# Patient Record
Sex: MALE | Race: WHITE | Employment: OTHER | URBAN - METROPOLITAN AREA
[De-identification: names, ages, dates, MRNs, and addresses within clinical notes are randomized per-mention and may not be internally consistent; named-entity substitution may affect disease eponyms.]

---

## 2020-01-21 ENCOUNTER — APPOINTMENT (OUTPATIENT)
Dept: ULTRASOUND IMAGING | Facility: CLINIC | Age: 50
End: 2020-01-21
Attending: EMERGENCY MEDICINE
Payer: COMMERCIAL

## 2020-01-21 ENCOUNTER — HOSPITAL ENCOUNTER (INPATIENT)
Facility: CLINIC | Age: 50
LOS: 1 days | Discharge: HOME | End: 2020-01-22
Attending: EMERGENCY MEDICINE | Admitting: INTERNAL MEDICINE
Payer: COMMERCIAL

## 2020-01-21 DIAGNOSIS — E87.6 HYPOKALEMIA: ICD-10-CM

## 2020-01-21 DIAGNOSIS — K92.0 COFFEE GROUND EMESIS: ICD-10-CM

## 2020-01-21 DIAGNOSIS — K92.2 UPPER GI BLEED: Primary | ICD-10-CM

## 2020-01-21 DIAGNOSIS — F10.930 ALCOHOL WITHDRAWAL, UNCOMPLICATED (H): ICD-10-CM

## 2020-01-21 DIAGNOSIS — E83.42 HYPOMAGNESEMIA: ICD-10-CM

## 2020-01-21 DIAGNOSIS — K85.20 ALCOHOL-INDUCED ACUTE PANCREATITIS WITHOUT INFECTION OR NECROSIS: ICD-10-CM

## 2020-01-21 LAB
ABO + RH BLD: NORMAL
ABO + RH BLD: NORMAL
ALBUMIN SERPL-MCNC: 4.3 G/DL (ref 3.4–5)
ALP SERPL-CCNC: 124 U/L (ref 40–150)
ALT SERPL W P-5'-P-CCNC: 45 U/L (ref 0–70)
ANION GAP SERPL CALCULATED.3IONS-SCNC: 16 MMOL/L (ref 3–14)
AST SERPL W P-5'-P-CCNC: 63 U/L (ref 0–45)
BASOPHILS # BLD AUTO: 0.1 10E9/L (ref 0–0.2)
BASOPHILS NFR BLD AUTO: 0.6 %
BILIRUB SERPL-MCNC: 2.6 MG/DL (ref 0.2–1.3)
BLD GP AB SCN SERPL QL: NORMAL
BLOOD BANK CMNT PATIENT-IMP: NORMAL
BUN SERPL-MCNC: 18 MG/DL (ref 7–30)
CALCIUM SERPL-MCNC: 9.9 MG/DL (ref 8.5–10.1)
CHLORIDE SERPL-SCNC: 90 MMOL/L (ref 94–109)
CO2 SERPL-SCNC: 21 MMOL/L (ref 20–32)
CREAT SERPL-MCNC: 0.6 MG/DL (ref 0.66–1.25)
DIFFERENTIAL METHOD BLD: ABNORMAL
EOSINOPHIL # BLD AUTO: 0.3 10E9/L (ref 0–0.7)
EOSINOPHIL NFR BLD AUTO: 2.3 %
ERYTHROCYTE [DISTWIDTH] IN BLOOD BY AUTOMATED COUNT: 12.8 % (ref 10–15)
ETHANOL SERPL-MCNC: <0.01 G/DL
GFR SERPL CREATININE-BSD FRML MDRD: >90 ML/MIN/{1.73_M2}
GLUCOSE SERPL-MCNC: 153 MG/DL (ref 70–99)
HCT VFR BLD AUTO: 38.5 % (ref 40–53)
HEMOCCULT STL QL: POSITIVE
HGB BLD-MCNC: 10.6 G/DL (ref 13.3–17.7)
HGB BLD-MCNC: 11.7 G/DL (ref 13.3–17.7)
HGB BLD-MCNC: 13.1 G/DL (ref 13.3–17.7)
IMM GRANULOCYTES # BLD: 0.1 10E9/L (ref 0–0.4)
IMM GRANULOCYTES NFR BLD: 0.4 %
INTERPRETATION ECG - MUSE: NORMAL
LACTATE BLD-SCNC: 1.1 MMOL/L (ref 0.7–2)
LACTATE BLD-SCNC: 2.8 MMOL/L (ref 0.7–2)
LIPASE SERPL-CCNC: 600 U/L (ref 73–393)
LYMPHOCYTES # BLD AUTO: 0.8 10E9/L (ref 0.8–5.3)
LYMPHOCYTES NFR BLD AUTO: 7.1 %
MAGNESIUM SERPL-MCNC: 1.4 MG/DL (ref 1.6–2.3)
MAGNESIUM SERPL-MCNC: 2.2 MG/DL (ref 1.6–2.3)
MCH RBC QN AUTO: 33.6 PG (ref 26.5–33)
MCHC RBC AUTO-ENTMCNC: 34 G/DL (ref 31.5–36.5)
MCV RBC AUTO: 99 FL (ref 78–100)
MONOCYTES # BLD AUTO: 1.7 10E9/L (ref 0–1.3)
MONOCYTES NFR BLD AUTO: 14.2 %
NEUTROPHILS # BLD AUTO: 8.8 10E9/L (ref 1.6–8.3)
NEUTROPHILS NFR BLD AUTO: 75.4 %
NRBC # BLD AUTO: 0 10*3/UL
NRBC BLD AUTO-RTO: 0 /100
PHOSPHATE SERPL-MCNC: 1.9 MG/DL (ref 2.5–4.5)
PHOSPHATE SERPL-MCNC: 2.2 MG/DL (ref 2.5–4.5)
PLATELET # BLD AUTO: 183 10E9/L (ref 150–450)
POTASSIUM SERPL-SCNC: 2.7 MMOL/L (ref 3.4–5.3)
POTASSIUM SERPL-SCNC: 3.2 MMOL/L (ref 3.4–5.3)
POTASSIUM SERPL-SCNC: 3.2 MMOL/L (ref 3.4–5.3)
PROT SERPL-MCNC: 8 G/DL (ref 6.8–8.8)
RBC # BLD AUTO: 3.9 10E12/L (ref 4.4–5.9)
SODIUM SERPL-SCNC: 127 MMOL/L (ref 133–144)
SODIUM SERPL-SCNC: 132 MMOL/L (ref 133–144)
SPECIMEN EXP DATE BLD: NORMAL
WBC # BLD AUTO: 11.7 10E9/L (ref 4–11)

## 2020-01-21 PROCEDURE — 25000128 H RX IP 250 OP 636: Performed by: PHYSICIAN ASSISTANT

## 2020-01-21 PROCEDURE — 86901 BLOOD TYPING SEROLOGIC RH(D): CPT | Performed by: PHYSICIAN ASSISTANT

## 2020-01-21 PROCEDURE — 76705 ECHO EXAM OF ABDOMEN: CPT

## 2020-01-21 PROCEDURE — 84100 ASSAY OF PHOSPHORUS: CPT | Performed by: INTERNAL MEDICINE

## 2020-01-21 PROCEDURE — 25000132 ZZH RX MED GY IP 250 OP 250 PS 637: Performed by: INTERNAL MEDICINE

## 2020-01-21 PROCEDURE — 25800030 ZZH RX IP 258 OP 636: Performed by: EMERGENCY MEDICINE

## 2020-01-21 PROCEDURE — 86900 BLOOD TYPING SEROLOGIC ABO: CPT | Performed by: PHYSICIAN ASSISTANT

## 2020-01-21 PROCEDURE — 36415 COLL VENOUS BLD VENIPUNCTURE: CPT | Performed by: PHYSICIAN ASSISTANT

## 2020-01-21 PROCEDURE — 85018 HEMOGLOBIN: CPT | Performed by: PHYSICIAN ASSISTANT

## 2020-01-21 PROCEDURE — 84295 ASSAY OF SERUM SODIUM: CPT | Performed by: PHYSICIAN ASSISTANT

## 2020-01-21 PROCEDURE — 96366 THER/PROPH/DIAG IV INF ADDON: CPT

## 2020-01-21 PROCEDURE — C9113 INJ PANTOPRAZOLE SODIUM, VIA: HCPCS | Performed by: PHYSICIAN ASSISTANT

## 2020-01-21 PROCEDURE — 96361 HYDRATE IV INFUSION ADD-ON: CPT

## 2020-01-21 PROCEDURE — 85025 COMPLETE CBC W/AUTO DIFF WBC: CPT | Performed by: EMERGENCY MEDICINE

## 2020-01-21 PROCEDURE — 83735 ASSAY OF MAGNESIUM: CPT | Performed by: EMERGENCY MEDICINE

## 2020-01-21 PROCEDURE — 25000128 H RX IP 250 OP 636: Performed by: EMERGENCY MEDICINE

## 2020-01-21 PROCEDURE — 96365 THER/PROPH/DIAG IV INF INIT: CPT

## 2020-01-21 PROCEDURE — C9113 INJ PANTOPRAZOLE SODIUM, VIA: HCPCS | Performed by: EMERGENCY MEDICINE

## 2020-01-21 PROCEDURE — 82272 OCCULT BLD FECES 1-3 TESTS: CPT | Performed by: EMERGENCY MEDICINE

## 2020-01-21 PROCEDURE — HZ2ZZZZ DETOXIFICATION SERVICES FOR SUBSTANCE ABUSE TREATMENT: ICD-10-PCS | Performed by: PHYSICIAN ASSISTANT

## 2020-01-21 PROCEDURE — 96368 THER/DIAG CONCURRENT INF: CPT

## 2020-01-21 PROCEDURE — 84132 ASSAY OF SERUM POTASSIUM: CPT | Performed by: PHYSICIAN ASSISTANT

## 2020-01-21 PROCEDURE — 80053 COMPREHEN METABOLIC PANEL: CPT | Performed by: EMERGENCY MEDICINE

## 2020-01-21 PROCEDURE — 96375 TX/PRO/DX INJ NEW DRUG ADDON: CPT

## 2020-01-21 PROCEDURE — 25000132 ZZH RX MED GY IP 250 OP 250 PS 637: Performed by: PHYSICIAN ASSISTANT

## 2020-01-21 PROCEDURE — 25000125 ZZHC RX 250: Performed by: PHYSICIAN ASSISTANT

## 2020-01-21 PROCEDURE — 12000000 ZZH R&B MED SURG/OB

## 2020-01-21 PROCEDURE — 36415 COLL VENOUS BLD VENIPUNCTURE: CPT | Performed by: INTERNAL MEDICINE

## 2020-01-21 PROCEDURE — 83690 ASSAY OF LIPASE: CPT | Performed by: EMERGENCY MEDICINE

## 2020-01-21 PROCEDURE — 99285 EMERGENCY DEPT VISIT HI MDM: CPT | Mod: 25

## 2020-01-21 PROCEDURE — 84132 ASSAY OF SERUM POTASSIUM: CPT | Performed by: INTERNAL MEDICINE

## 2020-01-21 PROCEDURE — 84100 ASSAY OF PHOSPHORUS: CPT | Performed by: PHYSICIAN ASSISTANT

## 2020-01-21 PROCEDURE — 83605 ASSAY OF LACTIC ACID: CPT | Performed by: PHYSICIAN ASSISTANT

## 2020-01-21 PROCEDURE — 80320 DRUG SCREEN QUANTALCOHOLS: CPT | Performed by: EMERGENCY MEDICINE

## 2020-01-21 PROCEDURE — 83735 ASSAY OF MAGNESIUM: CPT | Performed by: PHYSICIAN ASSISTANT

## 2020-01-21 PROCEDURE — 93005 ELECTROCARDIOGRAM TRACING: CPT

## 2020-01-21 PROCEDURE — 86850 RBC ANTIBODY SCREEN: CPT | Performed by: PHYSICIAN ASSISTANT

## 2020-01-21 PROCEDURE — 99223 1ST HOSP IP/OBS HIGH 75: CPT | Mod: AI | Performed by: INTERNAL MEDICINE

## 2020-01-21 PROCEDURE — 83605 ASSAY OF LACTIC ACID: CPT | Performed by: EMERGENCY MEDICINE

## 2020-01-21 PROCEDURE — 25800030 ZZH RX IP 258 OP 636: Performed by: PHYSICIAN ASSISTANT

## 2020-01-21 RX ORDER — MAGNESIUM SULFATE HEPTAHYDRATE 40 MG/ML
4 INJECTION, SOLUTION INTRAVENOUS EVERY 4 HOURS PRN
Status: DISCONTINUED | OUTPATIENT
Start: 2020-01-21 | End: 2020-01-22 | Stop reason: HOSPADM

## 2020-01-21 RX ORDER — MAGNESIUM SULFATE HEPTAHYDRATE 40 MG/ML
4 INJECTION, SOLUTION INTRAVENOUS ONCE
Status: COMPLETED | OUTPATIENT
Start: 2020-01-21 | End: 2020-01-21

## 2020-01-21 RX ORDER — MULTIPLE VITAMINS W/ MINERALS TAB 9MG-400MCG
1 TAB ORAL DAILY
Status: DISCONTINUED | OUTPATIENT
Start: 2020-01-22 | End: 2020-01-22 | Stop reason: HOSPADM

## 2020-01-21 RX ORDER — POTASSIUM CHLORIDE 1500 MG/1
20-40 TABLET, EXTENDED RELEASE ORAL
Status: DISCONTINUED | OUTPATIENT
Start: 2020-01-21 | End: 2020-01-22 | Stop reason: HOSPADM

## 2020-01-21 RX ORDER — SODIUM CHLORIDE, SODIUM LACTATE, POTASSIUM CHLORIDE, CALCIUM CHLORIDE 600; 310; 30; 20 MG/100ML; MG/100ML; MG/100ML; MG/100ML
INJECTION, SOLUTION INTRAVENOUS CONTINUOUS
Status: DISCONTINUED | OUTPATIENT
Start: 2020-01-21 | End: 2020-01-22 | Stop reason: HOSPADM

## 2020-01-21 RX ORDER — LORAZEPAM 1 MG/1
1-2 TABLET ORAL EVERY 30 MIN PRN
Status: DISCONTINUED | OUTPATIENT
Start: 2020-01-21 | End: 2020-01-22 | Stop reason: HOSPADM

## 2020-01-21 RX ORDER — NICOTINE 21 MG/24HR
1 PATCH, TRANSDERMAL 24 HOURS TRANSDERMAL DAILY
Status: DISCONTINUED | OUTPATIENT
Start: 2020-01-21 | End: 2020-01-22 | Stop reason: HOSPADM

## 2020-01-21 RX ORDER — NALOXONE HYDROCHLORIDE 0.4 MG/ML
.1-.4 INJECTION, SOLUTION INTRAMUSCULAR; INTRAVENOUS; SUBCUTANEOUS
Status: DISCONTINUED | OUTPATIENT
Start: 2020-01-21 | End: 2020-01-22 | Stop reason: HOSPADM

## 2020-01-21 RX ORDER — ONDANSETRON 4 MG/1
4 TABLET, ORALLY DISINTEGRATING ORAL EVERY 6 HOURS PRN
Status: DISCONTINUED | OUTPATIENT
Start: 2020-01-21 | End: 2020-01-22 | Stop reason: HOSPADM

## 2020-01-21 RX ORDER — POTASSIUM CL/LIDO/0.9 % NACL 10MEQ/0.1L
10 INTRAVENOUS SOLUTION, PIGGYBACK (ML) INTRAVENOUS
Status: DISCONTINUED | OUTPATIENT
Start: 2020-01-21 | End: 2020-01-22 | Stop reason: HOSPADM

## 2020-01-21 RX ORDER — LANOLIN ALCOHOL/MO/W.PET/CERES
100 CREAM (GRAM) TOPICAL DAILY
Status: DISCONTINUED | OUTPATIENT
Start: 2020-01-22 | End: 2020-01-22 | Stop reason: HOSPADM

## 2020-01-21 RX ORDER — LIDOCAINE 40 MG/G
CREAM TOPICAL
Status: DISCONTINUED | OUTPATIENT
Start: 2020-01-21 | End: 2020-01-22 | Stop reason: HOSPADM

## 2020-01-21 RX ORDER — LORAZEPAM 2 MG/ML
1-2 INJECTION INTRAMUSCULAR EVERY 30 MIN PRN
Status: DISCONTINUED | OUTPATIENT
Start: 2020-01-21 | End: 2020-01-22 | Stop reason: HOSPADM

## 2020-01-21 RX ORDER — ONDANSETRON 2 MG/ML
4 INJECTION INTRAMUSCULAR; INTRAVENOUS EVERY 6 HOURS PRN
Status: DISCONTINUED | OUTPATIENT
Start: 2020-01-21 | End: 2020-01-22 | Stop reason: HOSPADM

## 2020-01-21 RX ORDER — POTASSIUM CL/LIDO/0.9 % NACL 10MEQ/0.1L
10 INTRAVENOUS SOLUTION, PIGGYBACK (ML) INTRAVENOUS
Status: DISCONTINUED | OUTPATIENT
Start: 2020-01-21 | End: 2020-01-21

## 2020-01-21 RX ORDER — OXYCODONE HYDROCHLORIDE 5 MG/1
5-10 TABLET ORAL
Status: DISCONTINUED | OUTPATIENT
Start: 2020-01-21 | End: 2020-01-22 | Stop reason: HOSPADM

## 2020-01-21 RX ORDER — POTASSIUM CHLORIDE 29.8 MG/ML
20 INJECTION INTRAVENOUS
Status: DISCONTINUED | OUTPATIENT
Start: 2020-01-21 | End: 2020-01-22 | Stop reason: HOSPADM

## 2020-01-21 RX ORDER — ONDANSETRON 2 MG/ML
4 INJECTION INTRAMUSCULAR; INTRAVENOUS ONCE
Status: COMPLETED | OUTPATIENT
Start: 2020-01-21 | End: 2020-01-21

## 2020-01-21 RX ORDER — POTASSIUM CHLORIDE 7.45 MG/ML
10 INJECTION INTRAVENOUS
Status: DISCONTINUED | OUTPATIENT
Start: 2020-01-21 | End: 2020-01-22 | Stop reason: HOSPADM

## 2020-01-21 RX ORDER — POTASSIUM CHLORIDE 1.5 G/1.58G
20-40 POWDER, FOR SOLUTION ORAL
Status: DISCONTINUED | OUTPATIENT
Start: 2020-01-21 | End: 2020-01-22 | Stop reason: HOSPADM

## 2020-01-21 RX ORDER — FOLIC ACID 1 MG/1
1 TABLET ORAL DAILY
Status: DISCONTINUED | OUTPATIENT
Start: 2020-01-22 | End: 2020-01-22 | Stop reason: HOSPADM

## 2020-01-21 RX ORDER — LORAZEPAM 2 MG/ML
0.5 INJECTION INTRAMUSCULAR ONCE
Status: COMPLETED | OUTPATIENT
Start: 2020-01-21 | End: 2020-01-21

## 2020-01-21 RX ORDER — HYDROMORPHONE HYDROCHLORIDE 1 MG/ML
.3-.5 INJECTION, SOLUTION INTRAMUSCULAR; INTRAVENOUS; SUBCUTANEOUS
Status: DISCONTINUED | OUTPATIENT
Start: 2020-01-21 | End: 2020-01-22 | Stop reason: HOSPADM

## 2020-01-21 RX ADMIN — POTASSIUM CHLORIDE 20 MEQ: 1.5 POWDER, FOR SOLUTION ORAL at 20:14

## 2020-01-21 RX ADMIN — LORAZEPAM 0.5 MG: 2 INJECTION INTRAMUSCULAR; INTRAVENOUS at 09:53

## 2020-01-21 RX ADMIN — FOLIC ACID: 5 INJECTION, SOLUTION INTRAMUSCULAR; INTRAVENOUS; SUBCUTANEOUS at 14:23

## 2020-01-21 RX ADMIN — PANTOPRAZOLE SODIUM 40 MG: 40 INJECTION, POWDER, FOR SOLUTION INTRAVENOUS at 20:15

## 2020-01-21 RX ADMIN — ONDANSETRON HYDROCHLORIDE 4 MG: 2 INJECTION, SOLUTION INTRAMUSCULAR; INTRAVENOUS at 09:25

## 2020-01-21 RX ADMIN — POTASSIUM CHLORIDE 40 MEQ: 1.5 POWDER, FOR SOLUTION ORAL at 17:49

## 2020-01-21 RX ADMIN — PANTOPRAZOLE SODIUM 40 MG: 40 INJECTION, POWDER, FOR SOLUTION INTRAVENOUS at 09:25

## 2020-01-21 RX ADMIN — Medication 10 MEQ: at 11:01

## 2020-01-21 RX ADMIN — MAGNESIUM SULFATE IN WATER 4 G: 40 INJECTION, SOLUTION INTRAVENOUS at 11:01

## 2020-01-21 RX ADMIN — NICOTINE 1 PATCH: 14 PATCH, EXTENDED RELEASE TRANSDERMAL at 15:47

## 2020-01-21 RX ADMIN — SODIUM CHLORIDE 1000 ML: 9 INJECTION, SOLUTION INTRAVENOUS at 09:24

## 2020-01-21 ASSESSMENT — ACTIVITIES OF DAILY LIVING (ADL)
ADLS_ACUITY_SCORE: 15
ADLS_ACUITY_SCORE: 13

## 2020-01-21 ASSESSMENT — ENCOUNTER SYMPTOMS
VOMITING: 1
ABDOMINAL PAIN: 1
NAUSEA: 1
DIARRHEA: 0

## 2020-01-21 ASSESSMENT — MIFFLIN-ST. JEOR: SCORE: 1633.74

## 2020-01-21 NOTE — ED NOTES
Patient presents via EMS from [facility] in [place]. Patient has had 6 days of nausea, vomiting black, coffee ground-like emesis. Diarrhea started Sunday. Patient denies abdominal pain until he vomits. ABCDs intact, alert and oriented x 4.

## 2020-01-21 NOTE — ED NOTES
Bed: HW01  Expected date: 1/21/20  Expected time: 8:54 AM  Means of arrival: Ambulance  Comments:  A593  GI bleed

## 2020-01-21 NOTE — H&P
History and Physical     Dahiana Rivera MRN# [id number]   YOB: 1970 Age: 49 year old      Date of Admission:  1/21/2020    Primary care provider: No Ref-Primary, Physician          Assessment and Plan:   Dahiana Rivera is a 49 year old male with no dx PMH, who presents with 6 days of coffee ground emesis.     1. N/V, Possible upper GI bleed - pt notes 6 days of coffee ground emesis, none noted here. Stool is positive for occult blood, pt denies melena. Hgb is stable at 13.1. Pt endorses alcohol uses (per below), denies NSAID use. RUQ US shows fatty liver, otherwise unremarkable. Trend hgb, IV Protonix BID, NPO (ok for sips of water) and GI consult.   2. Alcohol abuse, possible withdrawal - pt reports drinking 2-3 drinks 5 days a week, denies hx of alcohol withdrawal. Labs indicate alcohol abuse with low sodium, potassium, and elevated AST. He's tachycardic on arrival and appears tremulous, he endorses this to nerves about being in the hospital. Pt states he has not drank for past days. If hx is accurate, he should not withdraw but will have CIWA protocol available, give IV then oral vitamins and IVFs. Given fatty liver on US, elevated bilirubin of 2.6, consider further outpatient follow up/work up.   3. Hyponatremia - Na 127, possibly due to vomiting and/or chronic alcohol use. IVFs and recheck this afternoon to ensure trending in the right direction.   4. Hypokalemia - K 2.7, possibly due to vomiting and/or chronic alcohol use. Replace per protocol.     Prophylaxis - mechanical  Code status - Full Code  Dispo - admit to inpatient, anticipate at least 2 nights                Chief Complaint:   Vomiting          History of Present Illness:   Dahiana Rivera is a 49 year old male with no dx PMH, who presents with coffee ground emesis. Pt reports onset of vomiting 6 days ago that he describes as black and looks like coffee grounds. He estimates 15 episodes in total. He states he has not eaten solids in 6  days. He notes his stools haven't been normal but denies diarrhea and black tarry stools. He denies fever, chills, SOB, abdominal pain, dysuria, hematuria or bright red blood in stool or emesis. He does note chest pain while vomiting. He denies NSAID use, endorses drinking 2-3 drinks per day 5 days a week. He smokes 1 pack of cigarettes per day. He denies hx of upper GI bleed or hx of alcohol withdrawal. He does state he's been a heavier drinker in the past. He denies any new medications or diet.    In the ED, mildly tachycardic, HRs 110s, T 99.4, VS otherwise stable. CMP is significant for low Na at 127, low K at 2.7, anion gap 16, Mag low at 1.4, total bilirubin is elevated at 2.6 and AST is minimally elevated at 63. Lactic acid is elevated at 2.8 and lipase is elevated at 600. CBC shows elevated WBC at 11.7 and Hgb is stable at 13.1. Stool is positive for occult blood. EtOH is <0.01. RUQ US shows fatty liver otherwise unremarkable. EKG shows sinus tachycardia and non specific ST abnormality.    Hx obtained by speaking with ED physician, chart review and pt interview.               Past Medical History:   History reviewed. No pertinent past medical history.            Past Surgical History:     Past Surgical History:   Procedure Laterality Date     NO HISTORY OF SURGERY                 Social History:     Social History     Socioeconomic History     Marital status:      Spouse name: Not on file     Number of children: Not on file     Years of education: Not on file     Highest education level: Not on file   Occupational History     Not on file   Social Needs     Financial resource strain: Not on file     Food insecurity:     Worry: Not on file     Inability: Not on file     Transportation needs:     Medical: Not on file     Non-medical: Not on file   Tobacco Use     Smoking status: Current Every Day Smoker     Smokeless tobacco: Current User     Tobacco comment: states 1 pack per week   Substance and Sexual  Activity     Alcohol use: Yes     Alcohol/week: 10.0 standard drinks     Types: 10 Shots of liquor per week     Comment: States 10 vodka drinks per week; Last drink last Wednesday, 6 days ago     Drug use: Not Currently     Sexual activity: Not on file   Lifestyle     Physical activity:     Days per week: Not on file     Minutes per session: Not on file     Stress: Not on file   Relationships     Social connections:     Talks on phone: Not on file     Gets together: Not on file     Attends Congregation service: Not on file     Active member of club or organization: Not on file     Attends meetings of clubs or organizations: Not on file     Relationship status: Not on file     Intimate partner violence:     Fear of current or ex partner: Not on file     Emotionally abused: Not on file     Physically abused: Not on file     Forced sexual activity: Not on file   Other Topics Concern     Not on file   Social History Narrative     Not on file               Family History:   Siblings with DM         Allergies:    No Known Allergies            Medications:     Prior to Admission medications    Medication Sig Last Dose Taking? Auth Provider   NO ACTIVE MEDICATIONS .   Reported, Patient   None           Review of Systems:   A Comprehensive greater than 10 system review of systems was carried out.  Pertinent positives and negatives are noted above.  Otherwise negative for contributory information.     Review Of Systems  Skin: negative  Eyes: negative  Ears/Nose/Throat: negative  Respiratory: No shortness of breath, dyspnea on exertion, cough, or hemoptysis  Cardiovascular: negative  Gastrointestinal: negative  Genitourinary: negative  Musculoskeletal: negative  Neurologic: negative  Psychiatric: negative  Hematologic/Lymphatic/Immunologic: negative  Endocrine: negative             Physical Exam:   Blood pressure 111/79, pulse 104, temperature 99.4  F (37.4  C), temperature source Oral, resp. rate 18, weight 83.9 kg (185 lb),  SpO2 98 %.  Exam:  GENERAL:  Comfortable, tremulous.  PSYCH: pleasant, oriented, No acute distress.  HEENT:  Atraumatic, normocephalic. Normal conjunctiva, normal hearing, and oropharynx is normal.  NECK:  Supple, no neck vein distention  HEART:  Normal S1, S2 with no murmur, no pericardial rub, gallops or S3 or S4.  LUNGS:  Clear to auscultation, normal Respiratory effort. No wheezing, rales or ronchi.  GI:  Soft, normal bowel sounds. Non-tender, non distended.   EXTREMITIES:  No pedal edema, +2 pulses bilateral and equal.  SKIN:  Dry to touch, No rash, wound or ulcerations.  NEUROLOGIC:  CN 2-12 intact, BL 5/5 symmetric upper and lower extremity strength, sensation is intact with no focal deficits.               Data:     Recent Labs   Lab 01/21/20  0920   WBC 11.7*   HGB 13.1*   HCT 38.5*   MCV 99        Recent Labs   Lab 01/21/20  0920   *   POTASSIUM 2.7*   CHLORIDE 90*   CO2 21   ANIONGAP 16*   *   BUN 18   CR 0.60*   GFRESTIMATED >90   GFRESTBLACK >90   VINNY 9.9   MAG 1.4*   PROTTOTAL 8.0   ALBUMIN 4.3   BILITOTAL 2.6*   ALKPHOS 124   AST 63*   ALT 45     Lactic acid - 2.8    Recent Labs   Lab 01/21/20  0920   LIPASE 600*       Recent Results (from the past 48 hour(s))   Abdomen US, limited (RUQ only)    Narrative    ULTRASOUND ABDOMEN LIMITED   1/21/2020 12:12 PM     HISTORY: Pancreatitis.    COMPARISON: None.    FINDINGS: Diffuse fatty infiltration of the liver. Small right hepatic  cyst measures 3 cm. The gallbladder is unremarkable, with no evidence  for shadowing gallstones or gallbladder wall thickening. Negative  sonographic Stokes sign. No intra or extrahepatic bile duct  dilatation. The common duct could not be visualized in its entirety.  Limited evaluation of the right kidney is unremarkable. Pancreas is  partially obscured by overlying bowel gas, but appears normal where  seen. The abdominal aorta and IVC are of normal caliber where  visualized.      Impression    IMPRESSION:    1. Diffuse fatty infiltration of the liver.   2. Small right hepatic cyst.  3. Otherwise unremarkable right upper quadrant ultrasound. No cause  for abdominal pain is identified.          Kalyn Shelton PA-C    This patient was seen and discussed with Dr. Cullen who agrees with the current plans as outlined above.

## 2020-01-21 NOTE — ED NOTES
Pt given urinal, informed to use call button for any needs.  Education provided to pt about fall risks when fall risk band applied.  To avoid injury, pt encouraged to use call light to alert staff if they have needs.  Examples of safety concerns provided.   Pt verbalizes understanding of fall risk and safety concerns.

## 2020-01-21 NOTE — CONSULTS
[facility]  Gastroenterology Consultation         Johnny Burgos MD    Patient Name: Dahiana Rivera MRN# [id number]   YOB: 1970 Age: 49 year old      Date of Admission:  1/21/2020  Today's Date: 01/21/2020         Assessment and Plan:     Impression:  1.  History of coffee-ground emesis with no clinical anemia or signs of active GI bleeding.  No reported melena.  BUN/creatinine not consistent with active bleeding.  Stool is heme positive but this is a little value in the setting.  Differential diagnosis includes likely alcoholic gastritis, MW tear, NSAID gastropathy. No obvious evidence for cirrhosis or suspicion for active upper GI bleeding.  2.  Chronic alcohol abuse with fatty liver.  No evidence for obvious cirrhosis or significant alcoholic hepatitis.  3.  Multiple medical problems including chronic alcohol use other treatment admitting team and other treating physicians.    Recommendations:  1.  Recommend continuing oral PPI therapy twice daily.  2.  At this juncture, do not see a role for upper GI endoscopy absent melena, hematemesis or significant anemia. If clinical bleeding ensues, will re-evaluate for EGD.  3.  Continue to monitor liver function testing.  4.  Agree with CIWA protocol.  5.  CBC tomorrow morning.  6.  Diet as tolerated.  7.  Continue IV fluids and supportive care per admitting team.  8.  Will follow while hospitalized.  Please call with any questions.  Thank you for allowing me to participate in this patient's health care.  9. Page out to discuss with Hospitalist team.            Primary Care Physician:     No Ref-Primary, Physician None            Requesting Physician:        Kalyn Shelton PA-C         Chief Complaint:     Coffee ground emesis         History of Present Illness:     Dahiana Rivera is a 49 year old male who presented with reported coffee-ground emesis.  Patient endorses a history of 6 days of emesis with what looks like  coffee-ground.  No hematemesis reported.  No rectal bleeding.  No melena reported.  Decreased appetite with no significant p.o. intake in 6 days.  He denies abdominal pain, fever, dysphagia, odynophagia, heartburn, shortness of breath, diaphoresis.  He denies chronic NSAID use and endorses drinking 2 to 3 alcoholic beverages daily 5 days/week.  Admitting alcohol not detectable.  He denies any prior history of liver disease, GI bleeding or alcohol withdrawal.  He reports overall that his alcohol consumption is less than prior.  Imaging on admission shows fatty liver but no obvious cirrhosis.  Laboratory not consistent with significant hepatitis.  However, liver function testing is elevated and numbers consistent with alcohol consumption.           Past Medical History:     History reviewed. No pertinent past medical history.          Past Surgical History:     Past Surgical History:   Procedure Laterality Date     NO HISTORY OF SURGERY              Home Medications:     Prior to Admission medications    Not on File            Current Medications:           [START ON 1/22/2020] folic acid  1 mg Oral Daily     [START ON 1/22/2020] multivitamin w/minerals  1 tablet Oral Daily     pantoprazole (PROTONIX) IV  40 mg Intravenous Q12H     sodium chloride (PF)  3 mL Intracatheter Q8H     [START ON 1/22/2020] vitamin B1  100 mg Oral Daily     HYDROmorphone, lidocaine 4%, lidocaine (buffered or not buffered), LORazepam **OR** LORazepam, magnesium sulfate, melatonin, naloxone, ondansetron **OR** ondansetron, oxyCODONE, potassium chloride, potassium chloride with lidocaine, potassium chloride, potassium chloride, potassium chloride, sodium chloride (PF)         Allergies:     No Known Allergies         Social History:     Dahiana Rivera  reports that he has been smoking. He uses smokeless tobacco. He reports current alcohol use of about 10.0 standard drinks of alcohol per week. He reports previous drug use.          Family  History:     History reviewed. No pertinent family history.          Review of Systems:     Comprehensive GI review of systems is completed and is negative except as above.             Physical Exam:     Vital Signs with Ranges  Temp:  [98.5  F (36.9  C)-99.4  F (37.4  C)] 98.5  F (36.9  C)  Pulse:  [104-105] 105  Heart Rate:  [102-113] 107  Resp:  [15-20] 18  BP: (111-147)/(74-98) 112/74  SpO2:  [95 %-98 %] 98 %  I/O's Last 24 hours  I/O last 3 completed shifts:  In: 1200 [IV Piggyback:1200]  Out: -     Constitutional:  Alert and no distress.   HEENT: PERRL, EOMI, sclera nonicteric, conjunctiva pink and moist.  NECK: Supple, nontender. No lymphadenopathy, JVD or bruits noted.  PULMONARY: Clear to auscultation bilaterally.  CARDIOVASCULAR: S1, S2, no S3, no S4, no murmur, regular rate and rhythm  ABDOMEN: Soft, nontender, nondistended, no tympany, no organomegaly, no fullness, guarding or rebound are noted.   NEURO: Alert and oriented to place, time and person. Neurological exam grossly nonfocal, CN II through XII are grossly intact. Detailed neurological exam is not performed.  EXT: No cyanosis, clubbing or edema.  RECTAL: brown stool, no melena noted.         Data:   All new lab and imaging data was reviewed.  .  Recent Labs   Lab Test 01/21/20  0920   WBC 11.7*   HGB 13.1*   MCV 99        Recent Labs   Lab Test 01/21/20  0920   *   POTASSIUM 2.7*   CHLORIDE 90*   CO2 21   BUN 18   CR 0.60*   ANIONGAP 16*     Recent Labs   Lab Test 01/21/20  0920   ALBUMIN 4.3   BILITOTAL 2.6*   ALT 45   AST 63*   ALKPHOS 124   LIPASE 600*            Johnny Burgos MD, FACG  [facility]  [phone]

## 2020-01-21 NOTE — PHARMACY-ADMISSION MEDICATION HISTORY
Admission medication history interview status for this patient is complete. See [facility] admission navigator for allergy information, prior to admission medications and immunization status.     Medication history interview source(s):Patient  Medication history resources (including written lists, pill bottles, clinic record):None  Primary pharmacy: N/a     Changes made to PTA medication list:  Added: none  Deleted: none  Changed: none    Actions taken by pharmacist (provider contacted, etc):None     Additional medication history information: no meds PTA per patient    Medication reconciliation/reorder completed by provider prior to medication history?  No     Do you take OTC medications (eg tylenol, ibuprofen, fish oil, eye/ear drops, etc)? No     For patients on insulin therapy: No    Prior to Admission medications    Not on File

## 2020-01-21 NOTE — ED PROVIDER NOTES
"  History     Chief Complaint:  Nausea & Vomiting     The history is provided by the patient.      Dahiana Rivera is a 49 year old male who presents to the emergency department today via EMS from [facility] with concerns for a GI bleed for evaluation of nausea and vomiting. The patient reports that he has been nauseated and vomiting for the past five days. He states the emesis has been all liquid and black \"like coffee grounds\", he estimates fifteen bouts of vomiting in the past two days. He also endorses having black stools but denies having any diarrhea. The patient reports that he has had some epigastric and mid sternal chest pain that has been associated with his vomiting. The patient endorses drinking ten alcoholic drinks per week and denies regular ibuprofen use.    Allergies:  No Known Drug Allergies     Medications:    Medications reviewed. No pertinent medications.    Past Medical History:    Medical history reviewed. No pertinent history was found.     Past Surgical History:    Surgical history reviewed. No pertinent surgical history.     Family History:    Family history reviewed. No pertinent family history.     Social History:  The patient was unaccompanied to the ED.  Alcohol Use: Positive, 10/week  Smoking Status: 1 pack/week  Marital Status:       Review of Systems   Gastrointestinal: Positive for abdominal pain, nausea and vomiting (black). Negative for diarrhea.        Melena    All other systems reviewed and are negative.      Physical Exam     Patient Vitals for the past 24 hrs:   BP Temp Temp src Pulse Heart Rate Resp SpO2 Weight   01/21/20 1050 111/79 -- -- -- 113 18 98 % --   01/21/20 1000 126/82 -- -- 104 105 15 97 % --   01/21/20 0934 -- -- -- -- -- -- -- 83.9 kg (185 lb)   01/21/20 0905 (!) 147/98 99.4  F (37.4  C) Oral -- 110 20 97 % --      Physical Exam  Vitals signs reviewed.   Constitutional:       Comments: Chronically ill-appearing   HENT:      Head: Normocephalic.      Right " Ear: Tympanic membrane normal.      Left Ear: Tympanic membrane normal.      Nose: Nose normal.      Mouth/Throat:      Mouth: Mucous membranes are moist.   Cardiovascular:      Rate and Rhythm: Normal rate and regular rhythm.   Pulmonary:      Effort: Pulmonary effort is normal.      Breath sounds: Normal breath sounds.   Abdominal:      Comments: Mild epigastric tenderness no guarding or rebound   Genitourinary:     Comments: Stool is brown but guaiac positive  Musculoskeletal: Normal range of motion.   Skin:     General: Skin is warm.      Capillary Refill: Capillary refill takes 2 to 3 seconds.   Neurological:      General: No focal deficit present.      Mental Status: He is alert.      Comments: Tremulous tachycardic mild alcohol withdrawal suspected   Psychiatric:         Mood and Affect: Mood normal.           Emergency Department Course     ECG:  ECG taken at 1016, ECG read at 1021  Sinus tachycardia  Nonspecific ST abnormality   Abnormal ECG   Rate 105 bpm. NE interval 142. QRS duration 96. QT/QTc 370/489. P-R-T axes 80 52 62.     Imaging:  Radiology findings were communicated with the patient who voiced understanding of the findings.  US, Abdomen, Limited RUQ Only  1. Diffuse fatty infiltration of the liver.   2. Small right hepatic cyst.  3. Otherwise unremarkable right upper quadrant ultrasound. No cause for abdominal pain is identified.   Reading per radiology     Laboratory:  Laboratory findings were communicated with the patient who voiced understanding of the findings.  CBC: WNL (WBC 11.7, HGB 13.1, )  CMP: Sodium 127 (L), potassium 2.7 (L), chloride 90 (L), glucose 153 (H), bilirubin total 2.6 (H), AST 63 (H). O/w WNL (Creatinine 0.60)   Lipase: 600 (H)   Magnesium: 1.4 (L)   Lactic Acid (Collected at 0920): 2.8 (H)     Stool (Occult blood): Positive   Alcohol ethyl: <0.01      Interventions:  0924 0.9% NaCl 1L IV   0925 Protonix 40 mg IV  0925 Zofran 4 mg IV   0953 Lorazepam 0.5 mg  IV  1101 Potassium chloride 10 mEq IV  1101 Magnesium sulfate 4 g IV     Emergency Department Course:  0904 Nursing notes and vitals reviewed.  0905 I performed an exam of the patient as documented above.   0921 IV was inserted and blood was drawn for laboratory testing, results above.   0922 I preformed a rectal exam on the patient as documented above.  1016 An ECG was performed, results above.   1106 I spoke with Kalyn Shelton PA-C for Dr. Cullen of the hospitalist service from [place] regarding patient's presentation, findings, and plan of care.    1133 The patient was sent for an ultrasound while in the emergency department, results above.       Findings and plan explained to the Patient who consents to admission. Discussed the patient with Dr. Cullen, who will admit the patient to an inpatient medical-surgical bed for further monitoring, evaluation, and treatment.     I personally reviewed the laboratory and ultrasound results with the Patient and answered all related questions prior to admission.      Impression & Plan    CMS Diagnoses:The Lactic acid level is elevated due to dehydration, at this time there is no sign of severe sepsis or septic shock.  Medical Decision Making:  Patient presents with dark emesis as well as epigastric pain.  Patient gives a history of trying drinks a week I doubt this is patient's clinical presentation is suspicious for wall withdrawal also electrolyte imbalances including hypokalemia and hypomagnesemia also could consistent.  Patient is recommended admission for further work-up patient has mild level elevation of lipase could include acute on chronic or acute pancreatitis.  Ultrasound of the gallbladder is ordered to rule out gallbladder associated pancreatitis but suspect suspect alcohol-related pancreatitis.  Patient recommend admission for electrolyte evaluation and consider GI consultation due to coffee-ground emesis and an alcoholic.        Diagnosis:    ICD-10-CM    1.  Alcohol withdrawal, uncomplicated (H) F10.230    2. Alcohol-induced acute pancreatitis without infection or necrosis K85.20    3. Coffee ground emesis K92.0    4. Hypokalemia E87.6    5. Hypomagnesemia E83.42        Disposition:  The patient is admitted into the care of Dr. Cullen.     Scribe Disclosure:  I, Rekha Rdz, am serving as a scribe at 9:05 AM on 1/21/2020 to document services personally performed by Alton Arceo MD based on my observations and the provider's statements to me.    1/21/2020   [facility] EMERGENCY DEPARTMENT       Alton Arceo MD  01/22/20 0849

## 2020-01-21 NOTE — ED NOTES
"[facility] [place]  ED Nurse Handoff Report    Dahiana Rivera is a 49 year old male   ED Chief complaint: Nausea, Vomiting, & Diarrhea  . ED Diagnosis:   Final diagnoses:   Alcohol withdrawal, uncomplicated (H)   Alcohol-induced acute pancreatitis without infection or necrosis   Coffee ground emesis   Hypokalemia   Hypomagnesemia     Allergies: No Known Allergies    Code Status: Full Code  Activity level - Baseline/Home:  Independent. Activity Level - Current:   Assist X 1. Lift room needed: No. Bariatric: No   Needed: No   Isolation: No. Infection: Not Applicable.     Vital Signs:   Vitals:    01/21/20 0905 01/21/20 0934 01/21/20 1000 01/21/20 1050   BP: (!) 147/98  126/82 111/79   Pulse:   104    Resp: 20  15 18   Temp: 99.4  F (37.4  C)      TempSrc: Oral      SpO2: 97%  97% 98%   Weight:  83.9 kg (185 lb)         Cardiac Rhythm:  ,      Pain level: 0-10 Pain Scale: 0  Patient confused: No. Patient Falls Risk: Yes.   Elimination Status: Has voided   Patient Report - Initial Complaint: Nausea and vomiting with coffee ground emesis since Thursday. Loose stools started on Sunday. Focused Assessment: States vomiting with coffee ground emesis and loose stools. Positive hemoccult. Pt denies pain. Pt with mild shakes, States last ETOH last Wednesday, \"I have 10 vodka drinks per week.\"   Tests Performed: labs, US. Abnormal Results:   Labs Ordered and Resulted from Time of ED Arrival Up to the Time of Departure from the ED   CBC WITH PLATELETS DIFFERENTIAL - Abnormal; Notable for the following components:       Result Value    WBC 11.7 (*)     RBC Count 3.90 (*)     Hemoglobin 13.1 (*)     Hematocrit 38.5 (*)     MCH 33.6 (*)     Absolute Neutrophil 8.8 (*)     Absolute Monocytes 1.7 (*)     All other components within normal limits   COMPREHENSIVE METABOLIC PANEL - Abnormal; Notable for the following components:    Sodium 127 (*)     Potassium 2.7 (*)     Chloride 90 (*)     Anion Gap 16 (*)     Glucose " 153 (*)     Creatinine 0.60 (*)     Bilirubin Total 2.6 (*)     AST 63 (*)     All other components within normal limits   LIPASE - Abnormal; Notable for the following components:    Lipase 600 (*)     All other components within normal limits   LACTIC ACID WHOLE BLOOD - Abnormal; Notable for the following components:    Lactic Acid 2.8 (*)     All other components within normal limits   MAGNESIUM - Abnormal; Notable for the following components:    Magnesium 1.4 (*)     All other components within normal limits   OCCULT BLOOD STOOL - Abnormal; Notable for the following components:    Occult Blood Positive (*)     All other components within normal limits   ALCOHOL ETHYL   PERIPHERAL IV CATHETER     Abdomen US, limited (RUQ only)    (Results Pending)     .   Treatments provided: IV fluids, IV potassium/Magnesium, ativan, zofran  Family Comments: none  OBS brochure/video discussed/provided to patient:  N/A  ED Medications:   Medications   potassium chloride 10 mEq in 100 mL intermittent infusion with 10 mg lidocaine (10 mEq Intravenous New Bag 1/21/20 1101)   magnesium sulfate 4 g in 100 mL sterile water (premade) (4 g Intravenous New Bag 1/21/20 1101)   0.9% sodium chloride BOLUS (0 mLs Intravenous Stopped 1/21/20 1101)   pantoprazole (PROTONIX) 40 mg IV push injection (40 mg Intravenous Given 1/21/20 0925)   ondansetron (ZOFRAN) injection 4 mg (4 mg Intravenous Given 1/21/20 0925)   LORazepam (ATIVAN) injection 0.5 mg (0.5 mg Intravenous Given 1/21/20 0953)     Drips infusing:  No  For the majority of the shift, the patient's behavior Green. Interventions performed were none.     Severe Sepsis OR Septic Shock Diagnosis Present: No      ED Nurse Name/Phone Number: Xochitl Chua RN,   11:43 AM    RECEIVING UNIT ED HANDOFF REVIEW    Above ED Nurse Handoff Report was reviewed: Yes  Reviewed by: Shira Lowery RN on January 21, 2020 at 1:03 PM

## 2020-01-22 VITALS
TEMPERATURE: 98.6 F | HEIGHT: 72 IN | WEIGHT: 161.1 LBS | RESPIRATION RATE: 18 BRPM | DIASTOLIC BLOOD PRESSURE: 62 MMHG | HEART RATE: 105 BPM | SYSTOLIC BLOOD PRESSURE: 106 MMHG | BODY MASS INDEX: 21.82 KG/M2 | OXYGEN SATURATION: 95 %

## 2020-01-22 LAB
ALBUMIN SERPL-MCNC: 3.5 G/DL (ref 3.4–5)
ALP SERPL-CCNC: 91 U/L (ref 40–150)
ALT SERPL W P-5'-P-CCNC: 32 U/L (ref 0–70)
ANION GAP SERPL CALCULATED.3IONS-SCNC: 8 MMOL/L (ref 3–14)
AST SERPL W P-5'-P-CCNC: 46 U/L (ref 0–45)
BASOPHILS # BLD AUTO: 0.1 10E9/L (ref 0–0.2)
BASOPHILS NFR BLD AUTO: 1.3 %
BILIRUB SERPL-MCNC: 1.7 MG/DL (ref 0.2–1.3)
BUN SERPL-MCNC: 15 MG/DL (ref 7–30)
CALCIUM SERPL-MCNC: 9 MG/DL (ref 8.5–10.1)
CHLORIDE SERPL-SCNC: 103 MMOL/L (ref 94–109)
CO2 SERPL-SCNC: 24 MMOL/L (ref 20–32)
CREAT SERPL-MCNC: 0.52 MG/DL (ref 0.66–1.25)
DIFFERENTIAL METHOD BLD: ABNORMAL
EOSINOPHIL # BLD AUTO: 1.2 10E9/L (ref 0–0.7)
EOSINOPHIL NFR BLD AUTO: 14.1 %
ERYTHROCYTE [DISTWIDTH] IN BLOOD BY AUTOMATED COUNT: 12.9 % (ref 10–15)
GFR SERPL CREATININE-BSD FRML MDRD: >90 ML/MIN/{1.73_M2}
GLUCOSE SERPL-MCNC: 83 MG/DL (ref 70–99)
HCT VFR BLD AUTO: 31.1 % (ref 40–53)
HGB BLD-MCNC: 10.4 G/DL (ref 13.3–17.7)
IMM GRANULOCYTES # BLD: 0.1 10E9/L (ref 0–0.4)
IMM GRANULOCYTES NFR BLD: 0.8 %
LIPASE SERPL-CCNC: 561 U/L (ref 73–393)
LYMPHOCYTES # BLD AUTO: 1.5 10E9/L (ref 0.8–5.3)
LYMPHOCYTES NFR BLD AUTO: 16.8 %
MAGNESIUM SERPL-MCNC: 1.9 MG/DL (ref 1.6–2.3)
MCH RBC QN AUTO: 34.2 PG (ref 26.5–33)
MCHC RBC AUTO-ENTMCNC: 33.4 G/DL (ref 31.5–36.5)
MCV RBC AUTO: 102 FL (ref 78–100)
MONOCYTES # BLD AUTO: 1.5 10E9/L (ref 0–1.3)
MONOCYTES NFR BLD AUTO: 16.7 %
NEUTROPHILS # BLD AUTO: 4.4 10E9/L (ref 1.6–8.3)
NEUTROPHILS NFR BLD AUTO: 50.3 %
NRBC # BLD AUTO: 0 10*3/UL
NRBC BLD AUTO-RTO: 0 /100
PHOSPHATE SERPL-MCNC: 3.1 MG/DL (ref 2.5–4.5)
PLATELET # BLD AUTO: 158 10E9/L (ref 150–450)
POTASSIUM SERPL-SCNC: 3.8 MMOL/L (ref 3.4–5.3)
PROT SERPL-MCNC: 6.4 G/DL (ref 6.8–8.8)
RBC # BLD AUTO: 3.04 10E12/L (ref 4.4–5.9)
SODIUM SERPL-SCNC: 135 MMOL/L (ref 133–144)
WBC # BLD AUTO: 8.8 10E9/L (ref 4–11)

## 2020-01-22 PROCEDURE — 25000132 ZZH RX MED GY IP 250 OP 250 PS 637: Performed by: PHYSICIAN ASSISTANT

## 2020-01-22 PROCEDURE — 25800030 ZZH RX IP 258 OP 636: Performed by: INTERNAL MEDICINE

## 2020-01-22 PROCEDURE — 36415 COLL VENOUS BLD VENIPUNCTURE: CPT | Performed by: PHYSICIAN ASSISTANT

## 2020-01-22 PROCEDURE — 99239 HOSP IP/OBS DSCHRG MGMT >30: CPT | Performed by: INTERNAL MEDICINE

## 2020-01-22 PROCEDURE — 83690 ASSAY OF LIPASE: CPT | Performed by: PHYSICIAN ASSISTANT

## 2020-01-22 PROCEDURE — 84100 ASSAY OF PHOSPHORUS: CPT | Performed by: INTERNAL MEDICINE

## 2020-01-22 PROCEDURE — 25000125 ZZHC RX 250: Performed by: INTERNAL MEDICINE

## 2020-01-22 PROCEDURE — 83735 ASSAY OF MAGNESIUM: CPT | Performed by: PHYSICIAN ASSISTANT

## 2020-01-22 PROCEDURE — 80053 COMPREHEN METABOLIC PANEL: CPT | Performed by: PHYSICIAN ASSISTANT

## 2020-01-22 PROCEDURE — 84100 ASSAY OF PHOSPHORUS: CPT | Performed by: PHYSICIAN ASSISTANT

## 2020-01-22 PROCEDURE — 85025 COMPLETE CBC W/AUTO DIFF WBC: CPT | Performed by: PHYSICIAN ASSISTANT

## 2020-01-22 PROCEDURE — 25000132 ZZH RX MED GY IP 250 OP 250 PS 637: Performed by: INTERNAL MEDICINE

## 2020-01-22 PROCEDURE — 25800030 ZZH RX IP 258 OP 636: Performed by: PHYSICIAN ASSISTANT

## 2020-01-22 RX ORDER — PANTOPRAZOLE SODIUM 40 MG/1
40 TABLET, DELAYED RELEASE ORAL
Status: DISCONTINUED | OUTPATIENT
Start: 2020-01-22 | End: 2020-01-22 | Stop reason: HOSPADM

## 2020-01-22 RX ORDER — PANTOPRAZOLE SODIUM 40 MG/1
40 TABLET, DELAYED RELEASE ORAL
Qty: 120 TABLET | Refills: 0 | Status: SHIPPED | OUTPATIENT
Start: 2020-01-22 | End: 2020-03-22

## 2020-01-22 RX ADMIN — Medication 100 MG: at 09:03

## 2020-01-22 RX ADMIN — FOLIC ACID 1 MG: 1 TABLET ORAL at 09:03

## 2020-01-22 RX ADMIN — MULTIPLE VITAMINS W/ MINERALS TAB 1 TABLET: TAB at 09:03

## 2020-01-22 RX ADMIN — POTASSIUM PHOSPHATE, MONOBASIC AND POTASSIUM PHOSPHATE, DIBASIC 20 MMOL: 224; 236 INJECTION, SOLUTION, CONCENTRATE INTRAVENOUS at 02:01

## 2020-01-22 RX ADMIN — NICOTINE 1 PATCH: 14 PATCH, EXTENDED RELEASE TRANSDERMAL at 09:05

## 2020-01-22 RX ADMIN — POTASSIUM CHLORIDE 20 MEQ: 1500 TABLET, EXTENDED RELEASE ORAL at 02:00

## 2020-01-22 RX ADMIN — POTASSIUM CHLORIDE 40 MEQ: 1500 TABLET, EXTENDED RELEASE ORAL at 00:06

## 2020-01-22 RX ADMIN — PANTOPRAZOLE SODIUM 40 MG: 40 TABLET, DELAYED RELEASE ORAL at 09:02

## 2020-01-22 RX ADMIN — SODIUM CHLORIDE, POTASSIUM CHLORIDE, SODIUM LACTATE AND CALCIUM CHLORIDE: 600; 310; 30; 20 INJECTION, SOLUTION INTRAVENOUS at 01:06

## 2020-01-22 ASSESSMENT — ACTIVITIES OF DAILY LIVING (ADL)
ADLS_ACUITY_SCORE: 15

## 2020-01-22 NOTE — PLAN OF CARE
Patient admitted with nausea and bloody stools.  VSS.  CIWA 4 and 3.  Has tremor, but reports last drink was last Wednesday. Had intermittent nausea, denies need for medication.  Tolerated a 2 gram sodium diet. Unsteady on feet, assist of one with ambulation.  Denies pain.  Potassium being replaced.  GI following.

## 2020-01-22 NOTE — PLAN OF CARE
Pt hospitalized for GI bleed.  Discharge education provided to patient and patient's wife.  Patient verbalized understanding of medications and orders.  Medications filled at [facility] pharmacy and sent with patient.   Pt verbalized will follow up with primary care provider.  Patient discharging to home  And transportation provided by wife. No further questions at this time.

## 2020-01-22 NOTE — PLAN OF CARE
VSS.  CIWA: 3/3.  Denies pain.  Denies N/V.  BS hypo, no loose stools.   Up w/1 assist - alarms on.  K+ 3.2, replaced, recheck AM.  Phos 1.9, replacing.   Hgb: 10.6  Consults: GI.

## 2020-01-22 NOTE — PROGRESS NOTES
[facility]  [facility]  Gastroenterology Progress Note    Interval History:    Patient reports no further emesis for over 24 hours now. No abdominal pain or bloody stools. Tolerated breakfast. Hemoglobin stable 10.6 to 10.4 overnight.    Physical Exam:      /62 (BP Location: Left arm)   Pulse 105   Temp 98.6  F (37  C) (Oral)   Resp 18   Ht 1.829 m (6')   Wt 73.1 kg (161 lb 1.6 oz)   SpO2 95%   BMI 21.85 kg/m    Temp (24hrs), Av.3  F (36.8  C), Min:97.2  F (36.2  C), Max:98.7  F (37.1  C)    Patient Vitals for the past 72 hrs:   Weight   20 1325 73.1 kg (161 lb 1.6 oz)   20 0934 83.9 kg (185 lb)       Intake/Output Summary (Last 24 hours) at 2020 1024  Last data filed at 2020 1759  Gross per 24 hour   Intake 1440 ml   Output --   Net 1440 ml         Constitutional: Comfortable, no distress.  Cardiovascular: RRR, normal S1/S2, no murmurs.  Respiratory: Effort normal, CTAB.  Abdomen: Soft, nondistended, nontender.  Skin: No jaundice.    Additional Comments:  ROS, FH, SH: See initial GI consult for details.      Laboratory Data:  Recent Labs   Lab Test 20  0702 20  2108 20  1530 20  0920   WBC 8.8  --   --  11.7*   HGB 10.4* 10.6* 11.7* 13.1*   *  --   --  99     --   --  183     Recent Labs   Lab Test 20  0702 20  2333 20  1530 20  0920     --  132* 127*   POTASSIUM 3.8 3.2* 3.2* 2.7*   CHLORIDE 103  --   --  90*   CO2 24  --   --  21   BUN 15  --   --  18   CR 0.52*  --   --  0.60*   ANIONGAP 8  --   --  16*   VINNY 9.0  --   --  9.9     Recent Labs   Lab Test 20  0702 20  0920   ALBUMIN 3.5 4.3   BILITOTAL 1.7* 2.6*   ALT 32 45   AST 46* 63*   ALKPHOS 91 124   LIPASE 561* 600*         Assessment & Plan:  Dahiana Rivera is a 49-year-old male with no significant PMH who presented with 6 days of coffee ground emesis. No associated abdominal pain, melena, or rectal bleeding.  Slight hemoglobin drop, but hemoglobin now stable at 10.4. No further emesis x 24 hours. Suspect upper GI bleeding from Janet-Feliciano tear, alcoholic gastritis, or NSAID gastropathy. Patient with elevated bilirubin and fatty liver on ultrasound, likely 2/2 alcohol use - but no evidence for cirrhosis.    1) Coffee ground emesis        - Now resolved.        - PPI.        - Diet as tolerated.        - OK for discharge from GI standpoint.    2) Alcoholic liver disease        - Evidenced by elevated AST, bilirubin, and fatty liver on US.        - Encouraged alcohol cessation.    Discussed with Dr. Burgos. GI will sign off at this time. Please call if further assistance is needed.    Kalyn Canada PA-C  [facility] ([facility])

## 2020-01-22 NOTE — DISCHARGE SUMMARY
[facility]  Discharge Summary Hospitalist    Date of Admission:  1/21/2020  Date of Discharge:  1/22/2020 12:15 PM  Discharging Provider: Staci Cullen DO  Date of Service (when I saw the patient): 01/22/20    Discharge Diagnoses   Coffee-ground emesis  Nausea and vomiting resulting in dehydration  Electrolyte abnormalities  Alcohol abuse  Pain Metabolic acidosis  Nicotine dependence    History of Present Illness   Dahiana Rivera is an 49 year old male with a benign past medical history who presented to [facility] ED on 1/21/2020 with coffee-ground emesis.    Hospital Course   Dahiana Rivera was admitted on 1/21/2020.  The following problems were addressed during his hospitalization:    Coffee-ground emesis with electrolyte abnormalities and dehydration: Patient's hemoglobin remained stable during hospitalization.  On admission his hemoglobin was 13.1.  But did drop to 10.6 remained stable.  Anticipate a component of hemoconcentration Ada had a leukocytosis also on admission that resolved without antibiotic therapy.  GI evaluated the patient and recommended continuing oral pantoprazole 40 mg twice daily.  Medication ordered on discharge continued for 6 to 8 weeks followed by being trialed off medication and evaluated by his PCP. Patient tolerated diet prior to discharge with resolution of symptoms.     Alcohol abuse: Patient was started on thiamine folate and a multivitamin.  He was monitored under WA protocol.  He never required Ativan.  Encouraged appropriate alcohol use on discharge    Alcoholic liver disease: Abnormalities in patient's AST, bili and right upper quadrant ultrasound consistent with fatty liver disease.  Encourage alcohol cessation and recheck labs with her PCP    Staci Cullen DO    Significant Results and Procedures   1/21/20 RUQ US IMPRESSION:   1. Diffuse fatty infiltration of the liver.   2. Small right hepatic cyst.  3. Otherwise unremarkable right upper quadrant ultrasound. No  cause  for abdominal pain is identified.     Pending Results  None    Code Status   Full Code       Primary Care Physician   Physician No Ref-Primary    Physical Exam   Temp: 98.6  F (37  C) Temp src: Oral BP: 106/62   Heart Rate: 93 Resp: 18 SpO2: 95 % O2 Device: None (Room air)    Vitals:    01/21/20 0934 01/21/20 1325   Weight: 83.9 kg (185 lb) 73.1 kg (161 lb 1.6 oz)     Vital Signs with Ranges  Temp:  [97.2  F (36.2  C)-98.6  F (37  C)] 98.6  F (37  C)  Heart Rate:  [] 93  Resp:  [16-18] 18  BP: (100-106)/(58-62) 106/62  SpO2:  [95 %] 95 %  I/O last 3 completed shifts:  In: 240 [P.O.:240]  Out: -     Constitutional: Awake, alert, cooperative, mildly anxious   HEENT: AT. NC. Conjunctiva and pupils examined and normal. Moist mucous membranes. Normal dentition.  Respiratory: Clear to auscultation bilaterally, no crackles or wheezing.  Cardiovascular: RRR. Normal S1 and S2, and no murmur noted.  GI: Soft, non-distended, non-tender, normal bowel sounds.  Skin: No rashes, no cyanosis, no edema.  Neurologic: Cranial nerves 2-12 intact, normal strength and sensation. Resting hand tremor   Psychiatric: Alert, oriented to person, place and time. No signs of anxiety or depression.     Discharge Disposition   Discharged to home  Condition at discharge: Stable    Consultations This Hospital Stay   GASTROENTEROLOGY IP CONSULT    Time Spent on this Encounter   Staci DILL DO, personally saw the patient today and spent greater than 30 minutes discharging this patient.    Discharge Orders      Reason for your hospital stay    Coffee ground emesis with dehydration     Follow-up and recommended labs and tests     Follow up with primary care provider, Physician No Ref-Primary, within 7 days for hospital follow- up.  The following labs/tests are recommended: CMP, CBC.     Activity    Your activity upon discharge: activity as tolerated     Full Code     Diet    Follow this diet upon discharge: Orders Placed This  Encounter      Regular Diet Adult     Discharge Medications   Current Discharge Medication List      START taking these medications    Details   pantoprazole (PROTONIX) 40 MG EC tablet Take 1 tablet (40 mg) by mouth 2 times daily (before meals)  Qty: 120 tablet, Refills: 0    Associated Diagnoses: Upper GI bleed           Allergies   No Known Allergies  Data   Most Recent 3 CBC's:  Recent Labs   Lab Test 01/22/20  0702 01/21/20  2108 01/21/20  1530 01/21/20  0920   WBC 8.8  --   --  11.7*   HGB 10.4* 10.6* 11.7* 13.1*   *  --   --  99     --   --  183      Most Recent 3 BMP's:  Recent Labs   Lab Test 01/22/20  0702 01/21/20  2333 01/21/20  1530 01/21/20  0920     --  132* 127*   POTASSIUM 3.8 3.2* 3.2* 2.7*   CHLORIDE 103  --   --  90*   CO2 24  --   --  21   BUN 15  --   --  18   CR 0.52*  --   --  0.60*   ANIONGAP 8  --   --  16*   VINNY 9.0  --   --  9.9   GLC 83  --   --  153*     Most Recent 2 LFT's:  Recent Labs   Lab Test 01/22/20  0702 01/21/20  0920   AST 46* 63*   ALT 32 45   ALKPHOS 91 124   BILITOTAL 1.7* 2.6*     Most Recent INR's and Anticoagulation Dosing History:  Anticoagulation Dose History     There is no flowsheet data to display.        Most Recent 3 Troponin's:No lab results found.  Most Recent Cholesterol Panel:No lab results found.  Most Recent 6 Bacteria Isolates From Any Culture (See EPIC Reports for Culture Details):No lab results found.  Most Recent TSH, T4 and A1c Labs:No lab results found.  Results for orders placed or performed during the hospital encounter of 01/21/20   Abdomen US, limited (RUQ only)    Narrative    ULTRASOUND ABDOMEN LIMITED   1/21/2020 12:12 PM     HISTORY: Pancreatitis.    COMPARISON: None.    FINDINGS: Diffuse fatty infiltration of the liver. Small right hepatic  cyst measures 3 cm. The gallbladder is unremarkable, with no evidence  for shadowing gallstones or gallbladder wall thickening. Negative  sonographic Stokes sign. No intra or  extrahepatic bile duct  dilatation. The common duct could not be visualized in its entirety.  Limited evaluation of the right kidney is unremarkable. Pancreas is  partially obscured by overlying bowel gas, but appears normal where  seen. The abdominal aorta and IVC are of normal caliber where  visualized.      Impression    IMPRESSION:   1. Diffuse fatty infiltration of the liver.   2. Small right hepatic cyst.  3. Otherwise unremarkable right upper quadrant ultrasound. No cause  for abdominal pain is identified.     TABITHA MORALES MD

## 2021-01-15 ENCOUNTER — HEALTH MAINTENANCE LETTER (OUTPATIENT)
Age: 51
End: 2021-01-15

## 2021-09-05 ENCOUNTER — HEALTH MAINTENANCE LETTER (OUTPATIENT)
Age: 51
End: 2021-09-05

## 2022-02-20 ENCOUNTER — HEALTH MAINTENANCE LETTER (OUTPATIENT)
Age: 52
End: 2022-02-20

## 2022-10-23 ENCOUNTER — HEALTH MAINTENANCE LETTER (OUTPATIENT)
Age: 52
End: 2022-10-23

## 2023-04-01 ENCOUNTER — HEALTH MAINTENANCE LETTER (OUTPATIENT)
Age: 53
End: 2023-04-01